# Patient Record
Sex: MALE | Race: WHITE | NOT HISPANIC OR LATINO | Employment: FULL TIME | URBAN - METROPOLITAN AREA
[De-identification: names, ages, dates, MRNs, and addresses within clinical notes are randomized per-mention and may not be internally consistent; named-entity substitution may affect disease eponyms.]

---

## 2019-03-16 ENCOUNTER — OFFICE VISIT (OUTPATIENT)
Dept: URGENT CARE | Facility: CLINIC | Age: 34
End: 2019-03-16
Payer: COMMERCIAL

## 2019-03-16 VITALS
TEMPERATURE: 101 F | OXYGEN SATURATION: 99 % | SYSTOLIC BLOOD PRESSURE: 153 MMHG | HEART RATE: 88 BPM | DIASTOLIC BLOOD PRESSURE: 77 MMHG | RESPIRATION RATE: 18 BRPM

## 2019-03-16 DIAGNOSIS — J11.1 INFLUENZA: Primary | ICD-10-CM

## 2019-03-16 PROCEDURE — G0382 LEV 3 HOSP TYPE B ED VISIT: HCPCS | Performed by: NURSE PRACTITIONER

## 2019-03-16 NOTE — LETTER
March 16, 2019     Patient: Veronika oClbert   YOB: 1985   Date of Visit: 3/16/2019       To Whom It May Concern: It is my medical opinion that Nelson Kaiser should remain out of work until fever free for 24 hours without the use of medications  If you have any questions or concerns, please don't hesitate to call           Sincerely,        MEHRDAD Felix    CC: No Recipients

## 2019-03-16 NOTE — PROGRESS NOTES
[facility]        NAME: Aneesh Hogan is a 35 y o  male  : 1985    MRN: [id number]  DATE: 2019  TIME: 10:34 AM    Assessment and Plan   Influenza [J11 1]  1  Influenza           Patient Instructions   You have influenza  The tamiflu (oseltamivir 75 mg PO BID x 5 days) will lessen the symptoms and the severity  You are contagious until fever free for 24 hours without medications  You may use over the counter medications as needed to treat symptoms  Follow up with PCP in 3-5 days  Proceed to  ER if symptoms worsen  Chief Complaint     Chief Complaint   Patient presents with    Cough     Pt c/o chest congestion and a cough for two days  History of Present Illness       Pt began having fever, chills, body aches, congestion, fatigue, decreased appetite, and occasional productive cough for light yellow sputum Thursday evening  He did not have a flu shot this season  No specific known sick contacts  He has used motrin for fever/body aches with symptom improvement  Review of Systems   Review of Systems   Constitutional: Positive for activity change, appetite change, chills, fatigue and fever  HENT: Positive for congestion, ear pain, postnasal drip, rhinorrhea, sinus pressure, sinus pain, sneezing and sore throat  Eyes: Negative for discharge  Respiratory: Positive for cough  Negative for chest tightness, shortness of breath and wheezing  Cardiovascular: Negative for chest pain  Gastrointestinal: Negative for abdominal pain, diarrhea, nausea and vomiting  Musculoskeletal: Positive for arthralgias (generalized--knees, elbows, back) and myalgias (generalized--legs, arms)  Negative for gait problem, joint swelling, neck pain and neck stiffness  Skin: Negative for pallor and rash  Neurological: Positive for headaches  Negative for dizziness, syncope, weakness and light-headedness  Psychiatric/Behavioral: Negative            Current Medications     No current outpatient medications on file  Current Allergies     Allergies as of 03/16/2019 - Reviewed 03/16/2019   Allergen Reaction Noted    Codeine  11/18/2013            The following portions of the patient's history were reviewed and updated as appropriate: allergies, current medications, past family history, past medical history, past social history, past surgical history and problem list      No past medical history on file  No past surgical history on file  No family history on file  Medications have been verified  Objective   /77   Pulse 88   Temp (!) 101 °F (38 3 °C)   Resp 18   SpO2 99%        Physical Exam     Physical Exam   Constitutional: He is oriented to person, place, and time  He appears well-developed and well-nourished  Non-toxic appearance  He has a sickly appearance  He appears ill  No distress  HENT:   Head: Normocephalic and atraumatic  Right Ear: Hearing, external ear and ear canal normal  No tenderness  Tympanic membrane is not injected, not erythematous and not bulging  Left Ear: Hearing, external ear and ear canal normal  No drainage, swelling or tenderness  Nose: Mucosal edema and rhinorrhea present  Right sinus exhibits frontal sinus tenderness  Right sinus exhibits no maxillary sinus tenderness  Left sinus exhibits frontal sinus tenderness  Left sinus exhibits no maxillary sinus tenderness  Mouth/Throat: Uvula is midline and mucous membranes are normal  Posterior oropharyngeal erythema (mild--appears irritated from post-nasal drip) present  No oropharyngeal exudate, posterior oropharyngeal edema or tonsillar abscesses  Tonsils are 0 on the right  Tonsils are 0 on the left  No tonsillar exudate  L ear blocked with dark brown cerumen  Unable to visualize TM  No signs of an acute infection  Instructed patient to use over the counter debrox to help dissolve blockage    R ear with a partial blockage--unable to visualize TM from 1 o'clock to 5 o'clock, but the portion visualized appears WNL  Pt made aware to try debrox on this side as well--pt agreeable  Pt aware this an incidental finding and not related to his primary symptoms  Eyes: Pupils are equal, round, and reactive to light  Conjunctivae are normal  Right eye exhibits no discharge  Left eye exhibits no discharge  Neck: Normal range of motion  Neck supple  Cardiovascular: Normal rate, regular rhythm, normal heart sounds and intact distal pulses  Exam reveals no gallop and no friction rub  No murmur heard  Pulmonary/Chest: Effort normal and breath sounds normal  No accessory muscle usage or stridor  No apnea, no tachypnea and no bradypnea  No respiratory distress  He has no decreased breath sounds  He has no wheezes  He has no rhonchi  He has no rales  He exhibits no tenderness  Abdominal: Soft  Bowel sounds are normal  He exhibits no distension  There is no tenderness  Musculoskeletal: Normal range of motion  He exhibits no edema  Lymphadenopathy:     He has cervical adenopathy  Neurological: He is alert and oriented to person, place, and time  Skin: Skin is warm and dry  Capillary refill takes less than 2 seconds  He is not diaphoretic  Psychiatric: He has a normal mood and affect  His behavior is normal  Judgment and thought content normal    Nursing note and vitals reviewed

## 2019-03-16 NOTE — PATIENT INSTRUCTIONS
You have influenza  The tamiflu (oseltamivir 75 mg PO BID x 5 days) will lessen the symptoms and the severity  You are contagious until fever free for 24 hours without medications  You may use over the counter medications as needed to treat symptoms  Influenza   AMBULATORY CARE:   Influenza  (the flu) is an infection caused by the influenza virus  The flu is easily spread when an infected person coughs, sneezes, or has close contact with others  You may be able to spread the flu to others for 1 week or longer after signs or symptoms appear  Common signs and symptoms include the following:   · Fever and chills    · Headaches, body aches, and muscle or joint pain    · Cough, runny nose, and sore throat    · Loss of appetite, nausea, vomiting, or diarrhea    · Tiredness    · Trouble breathing  Call 911 for any of the following:   · You have trouble breathing, and your lips look purple or blue  · You have a seizure  Seek care immediately if:   · You are dizzy, or you are urinating less or not at all  · You have a headache with a stiff neck, and you feel tired or confused  · You have new pain or pressure in your chest     · Your symptoms, such as shortness of breath, vomiting, or diarrhea, get worse  · Your symptoms, such as fever and coughing, seem to get better, but then get worse  Contact your healthcare provider if:   · You have new muscle pain or weakness  · You have questions or concerns about your condition or care  Treatment for influenza  may include any of the following:  · Acetaminophen  decreases pain and fever  It is available without a doctor's order  Ask how much to take and how often to take it  Follow directions  Acetaminophen can cause liver damage if not taken correctly  · NSAIDs , such as ibuprofen, help decrease swelling, pain, and fever  This medicine is available with or without a doctor's order  NSAIDs can cause stomach bleeding or kidney problems in certain people  If you take blood thinner medicine, always ask your healthcare provider if NSAIDs are safe for you  Always read the medicine label and follow directions  · Antivirals  help fight a viral infection  Manage your symptoms:   · Rest  as much as you can to help you recover  · Drink liquids as directed  to help prevent dehydration  Ask how much liquid to drink each day and which liquids are best for you  Prevent the spread of the flu:   · Wash your hands often  Use soap and water  Wash your hands after you use the bathroom, change a child's diapers, or sneeze  Wash your hands before you prepare or eat food  Use gel hand cleanser when soap and water are not available  Do not touch your eyes, nose, or mouth unless you have washed your hands first            · Cover your mouth when you sneeze or cough  Cough into a tissue or the bend of your arm  · Clean shared items with a germ-killing   Clean table surfaces, doorknobs, and light switches  Do not share towels, silverware, and dishes with people who are sick  Wash bed sheets, towels, silverware, and dishes with soap and water  · Wear a mask  over your mouth and nose if you are sick or are near anyone who is sick  · Stay away from others  if you are sick  · Influenza vaccine  helps prevent influenza (flu)  Everyone older than 6 months should get a yearly influenza vaccine  Get the vaccine as soon as it is available, usually in September or October each year  Follow up with your healthcare provider as directed:  Write down your questions so you remember to ask them during your visits  © 2017 [employer] Information is for End User's use only and may not be sold, redistributed or otherwise used for commercial purposes  All illustrations and images included in CareNotes® are the copyrighted property of [employer]  or [address]  The above information is an  only   It is not intended as medical advice for individual conditions or treatments  Talk to your doctor, nurse or pharmacist before following any medical regimen to see if it is safe and effective for you

## 2021-07-06 ENCOUNTER — HOSPITAL ENCOUNTER (EMERGENCY)
Facility: HOSPITAL | Age: 36
Discharge: HOME | End: 2021-07-06
Attending: EMERGENCY MEDICINE
Payer: COMMERCIAL

## 2021-07-06 VITALS
OXYGEN SATURATION: 99 % | TEMPERATURE: 98.5 F | WEIGHT: 197 LBS | DIASTOLIC BLOOD PRESSURE: 80 MMHG | SYSTOLIC BLOOD PRESSURE: 135 MMHG | BODY MASS INDEX: 30.92 KG/M2 | HEIGHT: 67 IN | RESPIRATION RATE: 18 BRPM | HEART RATE: 100 BPM

## 2021-07-06 DIAGNOSIS — H60.332 ACUTE SWIMMER'S EAR OF LEFT SIDE: ICD-10-CM

## 2021-07-06 DIAGNOSIS — H61.23 BILATERAL IMPACTED CERUMEN: Primary | ICD-10-CM

## 2021-07-06 PROCEDURE — 99282 EMERGENCY DEPT VISIT SF MDM: CPT

## 2021-07-06 PROCEDURE — 69209 REMOVE IMPACTED EAR WAX UNI: CPT | Performed by: EMERGENCY MEDICINE

## 2021-07-06 PROCEDURE — 99284 EMERGENCY DEPT VISIT MOD MDM: CPT | Performed by: EMERGENCY MEDICINE

## 2021-07-06 RX ORDER — CIPROFLOXACIN AND DEXAMETHASONE 3; 1 MG/ML; MG/ML
4 SUSPENSION/ DROPS AURICULAR (OTIC) 2 TIMES DAILY
Qty: 7.5 ML | Refills: 0 | Status: SHIPPED | OUTPATIENT
Start: 2021-07-06

## 2021-07-06 RX ORDER — CIPROFLOXACIN AND DEXAMETHASONE 3; 1 MG/ML; MG/ML
4 SUSPENSION/ DROPS AURICULAR (OTIC) ONCE
Status: COMPLETED | OUTPATIENT
Start: 2021-07-06 | End: 2021-07-06

## 2021-07-06 RX ADMIN — CIPROFLOXACIN AND DEXAMETHASONE 4 DROP: 3; 1 SUSPENSION/ DROPS AURICULAR (OTIC) at 23:38

## 2021-07-07 NOTE — ED PROVIDER NOTES
History  Chief Complaint   Patient presents with    Ear Fullness     Pt states he was swimming and now cannot hear from both ears, pt notes pain as well  27 yo M    Here for acute on chronic cerumen impaction   H/o similar    No other symptoms  No sore throat/fever/chills  No n/v      No other complaitns          Ear Fullness  Location:  Bialteral, Left more than right   Severity:  Moderate  Onset quality:  Gradual  Progression:  Unchanged  Chronicity:  Recurrent  Associated symptoms: no congestion, no ear pain, no fatigue, no fever, no headaches, no nausea, no shortness of breath, no sore throat and no vomiting        None       History reviewed  No pertinent past medical history  History reviewed  No pertinent surgical history  History reviewed  No pertinent family history  I have reviewed and agree with the history as documented  E-Cigarette/Vaping     E-Cigarette/Vaping Substances     Social History     Tobacco Use    Smoking status: Never Smoker    Smokeless tobacco: Former User     Types: Chew   Substance Use Topics    Alcohol use: Not Currently    Drug use: Never       Review of Systems   Constitutional: Negative for chills, diaphoresis, fatigue and fever  HENT: Negative for congestion, ear discharge, ear pain and sore throat  Bilateral cerumen impaction    Respiratory: Negative for shortness of breath  Gastrointestinal: Negative for nausea and vomiting  Neurological: Negative for headaches  All other systems reviewed and are negative  Physical Exam  Physical Exam  Constitutional:       General: He is not in acute distress  Appearance: He is well-developed  He is not ill-appearing, toxic-appearing or diaphoretic  HENT:      Head: Normocephalic and atraumatic  Right Ear: Tympanic membrane and ear canal normal  There is impacted cerumen  Left Ear: There is impacted cerumen        Nose: Nose normal       Mouth/Throat:      Pharynx: No oropharyngeal exudate  Eyes:      General: No scleral icterus  Right eye: No discharge  Left eye: No discharge  Conjunctiva/sclera: Conjunctivae normal       Pupils: Pupils are equal, round, and reactive to light  Neck:      Vascular: No JVD  Trachea: No tracheal deviation  Cardiovascular:      Rate and Rhythm: Normal rate and regular rhythm  Pulses: Normal pulses  Heart sounds: Normal heart sounds  No murmur heard  No friction rub  No gallop  Pulmonary:      Effort: Pulmonary effort is normal  No respiratory distress  Breath sounds: Normal breath sounds  No stridor  No wheezing, rhonchi or rales  Chest:      Chest wall: No tenderness  Abdominal:      Palpations: Abdomen is soft  Musculoskeletal:         General: No swelling, tenderness, deformity or signs of injury  Normal range of motion  Cervical back: Normal range of motion and neck supple  No rigidity  Right lower leg: No edema  Left lower leg: No edema  Lymphadenopathy:      Cervical: No cervical adenopathy  Skin:     General: Skin is warm  Capillary Refill: Capillary refill takes less than 2 seconds  Coloration: Skin is not jaundiced or pale  Findings: No bruising, erythema, lesion or rash  Neurological:      Mental Status: He is alert and oriented to person, place, and time  Cranial Nerves: No cranial nerve deficit  Sensory: No sensory deficit  Motor: No abnormal muscle tone  Coordination: Coordination normal    Psychiatric:         Mood and Affect: Mood normal          Behavior: Behavior normal          Thought Content:  Thought content normal          Judgment: Judgment normal          Vital Signs  ED Triage Vitals [07/06/21 2249]   Temperature Pulse Respirations Blood Pressure SpO2   98 5 °F (36 9 °C) 101 18 133/77 98 %      Temp Source Heart Rate Source Patient Position - Orthostatic VS BP Location FiO2 (%)   Temporal Monitor Lying Left arm --      Pain Score       2           Vitals:    07/06/21 2249 07/06/21 2341   BP: 133/77 135/80   Pulse: 101 100   Patient Position - Orthostatic VS: Lying Sitting         Visual Acuity      ED Medications  Medications   ciprofloxacin-dexamethasone (CIPRODEX) 0 3-0 1 % otic suspension 4 drop (4 drops Left Ear Given 7/6/21 2338)       Diagnostic Studies  Results Reviewed     None                 No orders to display              Procedures  Procedures         ED Course  ED Course as of Jul 06 2358 Tue Jul 06, 2021   2315 Following verbal consent , Right ear cerumen impaction successfully suctioned  TM clear  I attempted suctioning the lefty side, but this was unsuccessful due to the extent of impaction      2340 Following verbal consent, irrigated left which loosened the cerumen and allowed for removal by suction  Pt tolerated very well                                              MDM    Disposition  Final diagnoses:   Bilateral impacted cerumen   Acute swimmer's ear of left side     Time reflects when diagnosis was documented in both MDM as applicable and the Disposition within this note     Time User Action Codes Description Comment    7/6/2021 11:26 PM Carly Villalpando Add [H61 23] Bilateral impacted cerumen     7/6/2021 11:27 PM Carly Villalpando Add [H60 332] Acute swimmer's ear of left side       ED Disposition     ED Disposition Condition Date/Time Comment    Discharge Stable Tue Jul 6, 2021 11:26 PM Avelino Ng discharge to home/self care              Follow-up Information     Follow up With Specialties Details Why [facility], DO Family Medicine Call today  Gio Caba [address] [address], DO Otolaryngology Call today  [address]  [phone]            Discharge Medication List as of 7/6/2021 11:31 PM      START taking these medications    Details   ciprofloxacin-dexamethasone (CIPRODEX) otic suspension Administer 4 drops into the left ear 2 (two) times a day, Starting Tue 7/6/2021, Normal           No discharge procedures on file      PDMP Review     None          ED Provider  Electronically Signed by           Kelly Clifford MD  07/06/21 4554

## 2021-07-07 NOTE — DISCHARGE INSTRUCTIONS
RETURN IF WORSE IN ANY WAY:   WORSENING PAIN,   FEVER OR FLU LIKE SYMPTOMS,   OR NEW AND CONCERNING SYMPTOMS SIGNS OR SYMPTOMS      PLEASE CALL ENT DOCTOR IN THE MORNING TO SET UP FOLLOW UP   PLEASE REVIEW THE WORK UP RESULTS WITH YOUR DOCTOR      PATIENT SURVEY:   Thank you for your visit today  Your satisfaction is very very important to us  If you get a survey in the mail please rate your service as VERY GOOD, This helps our team to continue providing excellent care

## 2021-07-07 NOTE — ED PROCEDURE NOTE
PROCEDURE  Ear cerumen removal    Date/Time: 7/6/2021 11:57 PM  Performed by: Yanelis Cobos MD  Authorized by: Yanelis Cobos MD   Universal Protocol:  Consent: Verbal consent obtained  Risks and benefits: risks, benefits and alternatives were discussed  Consent given by: patient  Time out: Immediately prior to procedure a "time out" was called to verify the correct patient, procedure, equipment, support staff and site/side marked as required  Patient understanding: patient states understanding of the procedure being performed  Patient consent: the patient's understanding of the procedure matches consent given  Patient identity confirmed: verbally with patient      Patient location:  ED  Procedure details:     Location:  L ear and R ear    Procedure type: irrigation with instrumentation      Instrumentation: suction      Approach:  External  Post-procedure details:     Complication:  None    Hearing quality:  Improved    Patient tolerance of procedure:   Tolerated well, no immediate complications         Yanelis Cobos MD  07/06/21 2745

## 2024-03-01 NOTE — PRE-PROCEDURE INSTRUCTIONS
No outpatient medications have been marked as taking for the 3/8/24 encounter (Hospital Encounter).   Medication instructions for day surgery reviewed. Please use only a sip of water to take your instructed medications. Avoid all over the counter vitamins, supplements and NSAIDS for one week prior to surgery per anesthesia guidelines. Tylenol is ok to take as needed.     You will receive a call one business day prior to surgery with an arrival time and hospital directions. If your surgery is scheduled on a Monday, the hospital will be calling you on the Friday prior to your surgery. If you have not heard from anyone by 8pm, please call the hospital supervisor through the hospital  at [phone]. ([place] [phone] or [place] [phone]).    Do not eat or drink anything after midnight the night before your surgery, including candy, mints, lifesavers, or chewing gum. Do not drink alcohol 24hrs before your surgery. Try not to smoke at least 24hrs before your surgery.       Follow the pre surgery showering instructions as listed in the “My Surgical Experience Booklet” or otherwise provided by your surgeon's office. Do not use a blade to shave the surgical area 1 week before surgery. It is okay to use a clean electric clippers up to 24 hours before surgery. Do not apply any lotions, creams, including makeup, cologne, deodorant, or perfumes after showering on the day of your surgery. Do not use dry shampoo, hair spray, hair gel, or any type of hair products.     No contact lenses, eye make-up, or artificial eyelashes. Remove nail polish, including gel polish, and any artificial, gel, or acrylic nails if possible. Remove all jewelry including rings and body piercing jewelry.     Wear causal clothing that is easy to take on and off. Consider your type of surgery.    Keep any valuables, jewelry, piercings at home. Please bring any specially ordered equipment (sling, braces) if indicated.    Arrange for a  responsible person to drive you to and from the hospital on the day of your surgery. Please confirm the visitor policy for the day of your procedure when you receive your phone call with an arrival time.     Call the surgeon's office with any new illnesses, exposures, or additional questions prior to surgery.    Please reference your “My Surgical Experience Booklet” for additional information to prepare for your upcoming surgery.

## 2024-03-07 ENCOUNTER — ANESTHESIA EVENT (OUTPATIENT)
Dept: PERIOP | Facility: HOSPITAL | Age: 39
End: 2024-03-07
Payer: COMMERCIAL

## 2024-03-08 ENCOUNTER — ANESTHESIA (OUTPATIENT)
Dept: PERIOP | Facility: HOSPITAL | Age: 39
End: 2024-03-08
Payer: COMMERCIAL

## 2024-03-08 ENCOUNTER — HOSPITAL ENCOUNTER (OUTPATIENT)
Facility: HOSPITAL | Age: 39
Setting detail: OUTPATIENT SURGERY
Discharge: HOME | End: 2024-03-08
Attending: OTOLARYNGOLOGY | Admitting: OTOLARYNGOLOGY
Payer: COMMERCIAL

## 2024-03-08 VITALS
SYSTOLIC BLOOD PRESSURE: 150 MMHG | OXYGEN SATURATION: 97 % | WEIGHT: 197 LBS | HEIGHT: 67 IN | HEART RATE: 65 BPM | TEMPERATURE: 97.9 F | RESPIRATION RATE: 16 BRPM | DIASTOLIC BLOOD PRESSURE: 87 MMHG | BODY MASS INDEX: 30.92 KG/M2

## 2024-03-08 DIAGNOSIS — M95.0 NASAL VALVE COLLAPSE: ICD-10-CM

## 2024-03-08 DIAGNOSIS — J34.2 DEVIATED SEPTUM: ICD-10-CM

## 2024-03-08 DIAGNOSIS — J34.3 NASAL TURBINATE HYPERTROPHY: ICD-10-CM

## 2024-03-08 PROBLEM — E66.9 OBESITY (BMI 30.0-34.9): Status: ACTIVE | Noted: 2024-03-08

## 2024-03-08 PROCEDURE — L8699 PROSTHETIC IMPLANT NOS: HCPCS | Performed by: OTOLARYNGOLOGY

## 2024-03-08 PROCEDURE — 30468 RPR NSL VLV COLLAPSE W/IMPLT: CPT | Performed by: OTOLARYNGOLOGY

## 2024-03-08 PROCEDURE — 30520 REPAIR OF NASAL SEPTUM: CPT | Performed by: OTOLARYNGOLOGY

## 2024-03-08 PROCEDURE — 30140 RESECT INFERIOR TURBINATE: CPT | Performed by: OTOLARYNGOLOGY

## 2024-03-08 PROCEDURE — 88300 SURGICAL PATH GROSS: CPT | Performed by: PATHOLOGY

## 2024-03-08 DEVICE — IMPLANT NASAL VALVE LATERA: Type: IMPLANTABLE DEVICE | Site: NOSE | Status: FUNCTIONAL

## 2024-03-08 RX ORDER — ACETAMINOPHEN 325 MG/1
650 TABLET ORAL EVERY 4 HOURS PRN
Status: DISCONTINUED | OUTPATIENT
Start: 2024-03-08 | End: 2024-03-08 | Stop reason: HOSPADM

## 2024-03-08 RX ORDER — LIDOCAINE HYDROCHLORIDE 20 MG/ML
INJECTION, SOLUTION EPIDURAL; INFILTRATION; INTRACAUDAL; PERINEURAL AS NEEDED
Status: DISCONTINUED | OUTPATIENT
Start: 2024-03-08 | End: 2024-03-08

## 2024-03-08 RX ORDER — DEXAMETHASONE SODIUM PHOSPHATE 10 MG/ML
INJECTION, SOLUTION INTRAMUSCULAR; INTRAVENOUS AS NEEDED
Status: DISCONTINUED | OUTPATIENT
Start: 2024-03-08 | End: 2024-03-08

## 2024-03-08 RX ORDER — PROPOFOL 10 MG/ML
INJECTION, EMULSION INTRAVENOUS AS NEEDED
Status: DISCONTINUED | OUTPATIENT
Start: 2024-03-08 | End: 2024-03-08

## 2024-03-08 RX ORDER — PHENYLEPHRINE HCL IN 0.9% NACL 1 MG/10 ML
SYRINGE (ML) INTRAVENOUS AS NEEDED
Status: DISCONTINUED | OUTPATIENT
Start: 2024-03-08 | End: 2024-03-08

## 2024-03-08 RX ORDER — ONDANSETRON 2 MG/ML
4 INJECTION INTRAMUSCULAR; INTRAVENOUS EVERY 6 HOURS PRN
Status: DISCONTINUED | OUTPATIENT
Start: 2024-03-08 | End: 2024-03-08 | Stop reason: HOSPADM

## 2024-03-08 RX ORDER — GINSENG 100 MG
CAPSULE ORAL AS NEEDED
Status: DISCONTINUED | OUTPATIENT
Start: 2024-03-08 | End: 2024-03-08 | Stop reason: HOSPADM

## 2024-03-08 RX ORDER — ONDANSETRON 2 MG/ML
INJECTION INTRAMUSCULAR; INTRAVENOUS AS NEEDED
Status: DISCONTINUED | OUTPATIENT
Start: 2024-03-08 | End: 2024-03-08

## 2024-03-08 RX ORDER — SODIUM CHLORIDE 9 MG/ML
INJECTION, SOLUTION INTRAVENOUS AS NEEDED
Status: DISCONTINUED | OUTPATIENT
Start: 2024-03-08 | End: 2024-03-08 | Stop reason: HOSPADM

## 2024-03-08 RX ORDER — ONDANSETRON 2 MG/ML
4 INJECTION INTRAMUSCULAR; INTRAVENOUS ONCE AS NEEDED
Status: DISCONTINUED | OUTPATIENT
Start: 2024-03-08 | End: 2024-03-08 | Stop reason: HOSPADM

## 2024-03-08 RX ORDER — LIDOCAINE HYDROCHLORIDE 10 MG/ML
0.5 INJECTION, SOLUTION EPIDURAL; INFILTRATION; INTRACAUDAL; PERINEURAL ONCE AS NEEDED
Status: DISCONTINUED | OUTPATIENT
Start: 2024-03-08 | End: 2024-03-08 | Stop reason: HOSPADM

## 2024-03-08 RX ORDER — FENTANYL CITRATE 50 UG/ML
INJECTION, SOLUTION INTRAMUSCULAR; INTRAVENOUS AS NEEDED
Status: DISCONTINUED | OUTPATIENT
Start: 2024-03-08 | End: 2024-03-08

## 2024-03-08 RX ORDER — HYDROMORPHONE HCL/PF 1 MG/ML
0.5 SYRINGE (ML) INJECTION
Status: DISCONTINUED | OUTPATIENT
Start: 2024-03-08 | End: 2024-03-08 | Stop reason: HOSPADM

## 2024-03-08 RX ORDER — COCAINE HYDROCHLORIDE 40 MG/ML
SOLUTION NASAL AS NEEDED
Status: DISCONTINUED | OUTPATIENT
Start: 2024-03-08 | End: 2024-03-08 | Stop reason: HOSPADM

## 2024-03-08 RX ORDER — SODIUM CHLORIDE, SODIUM LACTATE, POTASSIUM CHLORIDE, CALCIUM CHLORIDE 600; 310; 30; 20 MG/100ML; MG/100ML; MG/100ML; MG/100ML
50 INJECTION, SOLUTION INTRAVENOUS CONTINUOUS
Status: DISCONTINUED | OUTPATIENT
Start: 2024-03-08 | End: 2024-03-08

## 2024-03-08 RX ORDER — LIDOCAINE HYDROCHLORIDE AND EPINEPHRINE 10; 10 MG/ML; UG/ML
INJECTION, SOLUTION INFILTRATION; PERINEURAL AS NEEDED
Status: DISCONTINUED | OUTPATIENT
Start: 2024-03-08 | End: 2024-03-08 | Stop reason: HOSPADM

## 2024-03-08 RX ORDER — DEXTROSE AND SODIUM CHLORIDE 5; .9 G/100ML; G/100ML
125 INJECTION, SOLUTION INTRAVENOUS CONTINUOUS
Status: DISCONTINUED | OUTPATIENT
Start: 2024-03-08 | End: 2024-03-08 | Stop reason: HOSPADM

## 2024-03-08 RX ORDER — ROCURONIUM BROMIDE 10 MG/ML
INJECTION, SOLUTION INTRAVENOUS AS NEEDED
Status: DISCONTINUED | OUTPATIENT
Start: 2024-03-08 | End: 2024-03-08

## 2024-03-08 RX ORDER — OXYCODONE HYDROCHLORIDE AND ACETAMINOPHEN 5; 325 MG/1; MG/1
2 TABLET ORAL EVERY 4 HOURS PRN
Status: DISCONTINUED | OUTPATIENT
Start: 2024-03-08 | End: 2024-03-08 | Stop reason: HOSPADM

## 2024-03-08 RX ORDER — MIDAZOLAM HYDROCHLORIDE 2 MG/2ML
INJECTION, SOLUTION INTRAMUSCULAR; INTRAVENOUS AS NEEDED
Status: DISCONTINUED | OUTPATIENT
Start: 2024-03-08 | End: 2024-03-08

## 2024-03-08 RX ORDER — SODIUM CHLORIDE, SODIUM LACTATE, POTASSIUM CHLORIDE, CALCIUM CHLORIDE 600; 310; 30; 20 MG/100ML; MG/100ML; MG/100ML; MG/100ML
100 INJECTION, SOLUTION INTRAVENOUS CONTINUOUS
Status: ACTIVE | OUTPATIENT
Start: 2024-03-08 | End: 2024-03-08

## 2024-03-08 RX ADMIN — HYDROMORPHONE HYDROCHLORIDE 0.5 MG: 1 INJECTION, SOLUTION INTRAMUSCULAR; INTRAVENOUS; SUBCUTANEOUS at 09:28

## 2024-03-08 RX ADMIN — SUGAMMADEX 200 MG: 100 INJECTION, SOLUTION INTRAVENOUS at 09:01

## 2024-03-08 RX ADMIN — ONDANSETRON 4 MG: 2 INJECTION INTRAMUSCULAR; INTRAVENOUS at 08:49

## 2024-03-08 RX ADMIN — LIDOCAINE HYDROCHLORIDE 100 MG: 20 INJECTION, SOLUTION EPIDURAL; INFILTRATION; INTRACAUDAL; PERINEURAL at 08:07

## 2024-03-08 RX ADMIN — ROCURONIUM BROMIDE 50 MG: 10 INJECTION, SOLUTION INTRAVENOUS at 08:08

## 2024-03-08 RX ADMIN — ROCURONIUM BROMIDE 10 MG: 10 INJECTION, SOLUTION INTRAVENOUS at 08:45

## 2024-03-08 RX ADMIN — DEXAMETHASONE SODIUM PHOSPHATE 10 MG: 10 INJECTION, SOLUTION INTRAMUSCULAR; INTRAVENOUS at 08:08

## 2024-03-08 RX ADMIN — Medication 4 MCG: at 08:18

## 2024-03-08 RX ADMIN — FENTANYL CITRATE 100 MCG: 50 INJECTION INTRAMUSCULAR; INTRAVENOUS at 08:07

## 2024-03-08 RX ADMIN — PROPOFOL 120 MCG/KG/MIN: 10 INJECTION, EMULSION INTRAVENOUS at 08:12

## 2024-03-08 RX ADMIN — MIDAZOLAM 2 MG: 1 INJECTION INTRAMUSCULAR; INTRAVENOUS at 08:04

## 2024-03-08 RX ADMIN — Medication 4 MCG: at 08:53

## 2024-03-08 RX ADMIN — Medication 100 MCG: at 08:32

## 2024-03-08 RX ADMIN — SODIUM CHLORIDE, SODIUM LACTATE, POTASSIUM CHLORIDE, AND CALCIUM CHLORIDE: .6; .31; .03; .02 INJECTION, SOLUTION INTRAVENOUS at 09:01

## 2024-03-08 RX ADMIN — OXYCODONE HYDROCHLORIDE AND ACETAMINOPHEN 2 TABLET: 5; 325 TABLET ORAL at 10:01

## 2024-03-08 RX ADMIN — PROPOFOL 250 MG: 10 INJECTION, EMULSION INTRAVENOUS at 08:07

## 2024-03-08 RX ADMIN — HYDROMORPHONE HYDROCHLORIDE 0.5 MG: 1 INJECTION, SOLUTION INTRAMUSCULAR; INTRAVENOUS; SUBCUTANEOUS at 09:41

## 2024-03-08 RX ADMIN — SODIUM CHLORIDE, SODIUM LACTATE, POTASSIUM CHLORIDE, AND CALCIUM CHLORIDE 50 ML/HR: .6; .31; .03; .02 INJECTION, SOLUTION INTRAVENOUS at 07:30

## 2024-03-08 NOTE — ANESTHESIA PREPROCEDURE EVALUATION
Procedure:  SEPTOPLASTY (Nose)  PARTIAL INFERIOR TURBINATE REDUCTION (SUBMUCOSAL RESECTION) AND OUTFRACTURE WITH LATERA IMPLANT (Nose)    Relevant Problems   Other   (+) Obesity (BMI 30.0-34.9)     Reports negative sleep study for PEDRO     Denies recent fever, cough or other symptom of upper respiratory tract infection.    Confirmed NPO appropriate    Physical Exam    Airway    Mallampati score: III  TM Distance: >3 FB  Neck ROM: full     Dental   No notable dental hx     Cardiovascular      Pulmonary      Other Findings        Anesthesia Plan  ASA Score- 2     Anesthesia Type- general with ASA Monitors.         Additional Monitors:     Airway Plan: ETT.           Plan Factors-Exercise tolerance (METS): >4 METS.Exercise comment: Able to climb two flights of stairs without cardiopulmonary limitation.    Chart reviewed.        Patient is not a current smoker.  Patient did not smoke on day of surgery.            Induction- intravenous.    Postoperative Plan-     Informed Consent- Anesthetic plan and risks discussed with patient.

## 2024-03-08 NOTE — DISCHARGE INSTRUCTIONS
2 Percocet tablets taken at 10:01 this morning.  Follow prescriptions instructions for time that next dose is due.

## 2024-03-08 NOTE — ANESTHESIA POSTPROCEDURE EVALUATION
Post-Op Assessment Note    CV Status:  Stable  Pain Score: 0    Pain management: adequate       Mental Status:  Arousable   Hydration Status:  Stable   PONV Controlled:  None   Airway Patency:  Patent     Post Op Vitals Reviewed: Yes    No anethesia notable event occurred.    Staff: IAN           /93 (03/08/24 0915)    Temp 98.4 °F (36.9 °C) (03/08/24 0915)    Pulse 77 (03/08/24 0915)   Resp 12 (03/08/24 0915)    SpO2 100 % (03/08/24 0915)

## 2024-03-08 NOTE — OP NOTE
OPERATIVE REPORT  PATIENT NAME: John Jose    :  1985  MRN: [id number]  Pt Location: [facility] OR ROOM 03    SURGERY DATE: 3/8/2024    Surgeons and Role:     * Osvaldo Toledo DO - Primary    Preop Diagnosis:  Nasal valve collapse [M95.0]  Deviated septum [J34.2]  Nasal turbinate hypertrophy [J34.3]  Nasal valve stenosis left side    Post-Op Diagnosis Codes:     * Nasal valve collapse [M95.0]     * Deviated septum [J34.2]     * Nasal turbinate hypertrophy [J34.3]      *Nasal valve stenosis    Procedure(s):  SEPTOPLASTY  PARTIAL INFERIOR TURBINATE REDUCTION (SUBMUCOSAL RESECTION) AND OUTFRACTURE   LATERA IMPLANT left side    Specimen(s):  ID Type Source Tests Collected by Time Destination   1 : SEPTAL CARTILAGE & BONE Tissue Nose TISSUE EXAM Osvaldo Crow Toledo DO 3/8/2024 0832        Estimated Blood Loss:   Minimal    Drains:  * No LDAs found *    Anesthesia Type:   General    Operative Indications:  Nasal valve collapse [M95.0]  Deviated septum [J34.2]  Nasal turbinate hypertrophy [J34.3]      Operative Findings:  Left nasal valve collapse/stenosis  Deviated nasal septum with bowing of cartilage to the right anteriorly and a bony septal spur inferiorly on the left  Inferior turbinate hypertrophy left greater than right    Complications:   None    Procedure and Technique:  Patient was identified in the holding area and the consent was reviewed and agreed upon.  He was taken to the operating room placed on the OR table in supine position.  He was placed under general anesthesia using an endotracheal tube.  He was prepped and draped in the usual fashion for the above surgery.  Normal timeout was obtained and all information was noted to be correct.  Cocaine soaked cotton pledgets x 2 were placed in each nasal cavity for 5 minutes and then removed.  1% Xylocaine with 1 100,000 epinephrine was then injected into the mucoperichondrial flaps bilaterally as well as the tips of the inferior turbinates.  I also  cleaned the external nose and marked out the nose for the Latera implant in the usual tract along the nasal bone.  A total of 4 mL was used.  Pledgets were then reintroduced for another 3 minutes and then removed.  I made a mucoperichondrial incision on the left side of the septum and elevated the right side of the mucoperichondrium due to the deflection.  Once this was deflected I made a angled incision with a D knife into the septum approximately 1-1/2 cm back exposing the deflected portions of cartilage both superiorly and inferiorly.  I elevated the mucoperichondrial on the opposite side.  I took this all the way down to the bone on the floor of the nose on both sides.  Cartilaginous deviation was removed with a caudal elevator and pituitary forceps.  Hemorrhage chisel was used to remove the bony abnormality.  At the completion the septum was midline.  I made a small incision in the columellar skin and tucked the tip of the caudal end of the cartilage into this area.  He is site was closed with interrupted 4-0 chromic suture.  I then performed a submucosal resection of each inferior turbinate with outfracture.  This was done using the Takipi endoscopic shaver blade.  I pierced the anterior tip of the left inferior turbinate and perform submucosal resection from anterior to posterior with visible reduction in the size of the turbinate.  I then performed the same procedure on the opposite side.  A large nasal speculum was then used to outfracture each of these.  Latera implant was then placed in the usual fashion using the introductory needle.  This was placed up against the bone of the left side.  Upon removal there was no extrusion of the implant.  Bleeding from the needle site stopped after holding pressure for approximately 1 minute.  There was no bruising.  Antibiotic coated Ventura splints were then placed on each side of the nasal cavity and sutured in place with a 2-0 nylon suture.  He was cleaned with  wet-to-dry.  A drip pad was placed.  He was awoken, extubated, and taken to recovery in stable condition.   I was present for the entire procedure.    Patient Disposition:  PACU         SIGNATURE: Osvaldo Toledo,   DATE: March 8, 2024  TIME: 9:02 AM

## 2024-03-08 NOTE — DISCHARGE INSTR - AVS FIRST PAGE
[facility]    POST OPERATIVE SEPTOPLASTY/TURBINATE REDUCTION SURGERY INTRUCTIONS      1. Change drip pad under the nose as needed for bleeding.    2. Keep head of bed elevated. Sleep on at least a few pillows at night.    3. Expect and do not become concerned about not being able to breathe through your nose. You will be a mouth-breather for approximately one week.    4. You may cleanse crusting from the anterior portion of your nose with hydrogen peroxide and Q-tips. You may use Ocean nasal spray throughout the day to prevent crusting inside nasal cavity and splints.    5. Begin using sinus rinse two times daily. If you have nasal splints in place you may start after the nasal splints are removed.    6. Do not blow your nose until exactly two weeks after surgery.    7. If you must sneeze, sneeze with mouth open.    8. Avoid any strenuous activity, exercise, bending, or lifting, until approved by your surgeon.    9. If there is significant bleeding, you may use over-the-counter Afrin. Place 2 sprays into the bleeding nostril every 5 minutes up to 3 consecutive times. If bleeding does not stop, call our office at [phone] or [phone] or go directly to the emergency room.    10. If you have severe pain which is uncontrolled by medication or a fever greater than 101°F, notify our office immediately at [phone] or [phone].    11.  If you have a prescription for pain medication follow appropriate directions on label.  If no prescription was given you may take over the counter pain medication as needed.    12.  If you have a prescription for steroids (Prednisone, Prednisolone) you may begin taking the medication the day following the surgical procedure.     13.  No smoking.  Avoid second hand smoke exposure.    Do not manipulate the outside of the nose where the Latera has been implanted.  Do not pick at the inside of the nose.  Try and avoid wearing any glasses on the nasal bridge for the first week  after surgery

## 2024-03-11 PROCEDURE — 88300 SURGICAL PATH GROSS: CPT | Performed by: PATHOLOGY

## (undated) DEVICE — NEURO PATTIES 1/2 X 3

## (undated) DEVICE — NEEDLE 18 G X 1 1/2

## (undated) DEVICE — SKIN MARKER DUAL TIP WITH RULER CAP, FLEXIBLE RULER AND LABELS: Brand: DEVON

## (undated) DEVICE — TUBING SUCTION 5MM X 12 FT

## (undated) DEVICE — BLADE 1882040 5PK INFERIOR TURB 2MM

## (undated) DEVICE — SUT CHROMIC 4-0 PS-4 18 IN 1643G

## (undated) DEVICE — ANTI-FOG SOLUTION WITH FOAM PAD: Brand: DEVON

## (undated) DEVICE — SCD SEQUENTIAL COMPRESSION COMFORT SLEEVE MEDIUM KNEE LENGTH: Brand: KENDALL SCD

## (undated) DEVICE — 4-PORT MANIFOLD: Brand: NEPTUNE 2

## (undated) DEVICE — INTENDED FOR TISSUE SEPARATION, AND OTHER PROCEDURES THAT REQUIRE A SHARP SURGICAL BLADE TO PUNCTURE OR CUT.: Brand: BARD-PARKER ® CARBON RIB-BACK BLADES

## (undated) DEVICE — SUT ETHILON 2-0 FS 18 IN 664H

## (undated) DEVICE — GLOVE SRG BIOGEL ORTHOPEDIC 7

## (undated) DEVICE — STERILE BETHLEHEM NASAL PACK: Brand: CARDINAL HEALTH

## (undated) DEVICE — SPLINT 1524050 5PK PAIR DOYLE II AIRWAY: Brand: DOYLE II ™

## (undated) DEVICE — NEEDLE 25G X 1 1/2